# Patient Record
(demographics unavailable — no encounter records)

---

## 2024-11-11 NOTE — DISCUSSION/SUMMARY
[FreeTextEntry1] : URI and LOM - URI:  Recommend supportive care including antipyretics, fluids, OTC cough/cold medications if age-appropriate, and nasal saline/saline nebs followed by nasal suction. Return if symptoms worsen or persist. - LOM:  Complete antibiotic course. Potential side effect of antibiotics includes but not limited to diarrhea. Provide ibuprofen as needed for pain or fever. If no improvement within 48 hours return for re-evaluation.   - Return precautions reviewed. Patient to seek medical attention in ED if has decreased oral intake, decrease in wet diapers/voids, fever >100.4F, difficulty breathing, becomes lethargic, or has a change in mental status or alertness. To note if fever > 5 days must be seen immediately either in clinic or in ED. - Return/ED precautions given.  RTC routine and prn.  Caretaker expressed understanding and all questions answered.

## 2024-11-11 NOTE — HISTORY OF PRESENT ILLNESS
[de-identified] : sick [FreeTextEntry6] : 5y/o M pmhx seasonal allergies p/w cough and stuffy nose with questionable left ear pain on and off but worse overall yesterday.  No meds.  No fevers.  Tolerating PO.  Using saline nebs once a day.

## 2024-11-27 NOTE — DISCUSSION/SUMMARY
[] : The components of the vaccine(s) to be administered today are listed in the plan of care. The disease(s) for which the vaccine(s) are intended to prevent and the risks have been discussed with the caretaker.  The risks are also included in the appropriate vaccination information statements which have been provided to the patient's caregiver.  The caregiver has given consent to vaccinate. [FreeTextEntry1] : 4 year M presenting for flu shot.  Tolerated well without issue.  RTC routine and prn.  Caretaker expressed understanding and all questions answered.

## 2025-01-13 NOTE — HISTORY OF PRESENT ILLNESS
[FreeTextEntry6] : still not feeling well cough, congestion, runny nose still  now with RIGHT ear pain fever 100.9f - Ibuprofen and Acetaminophen prn  decreased appetite and is more tired  no vomiting, diarrhea, sob or difficulty breathing, joint pain or swelling, rash, urinary symptoms, headaches, LEFT ear ear pain or tugging

## 2025-01-13 NOTE — PHYSICAL EXAM
[NL] : warm, clear [Clear] : left tympanic membrane clear [Erythema] : erythema [Bulging] : bulging [Clear Rhinorrhea] : clear rhinorrhea

## 2025-01-13 NOTE — DISCUSSION/SUMMARY
[FreeTextEntry1] : URI: Recommend supportive care including antipyretics, fluids, OTC cough/cold medications if age-appropriate, and nasal saline followed by nasal suction. Alarming breathing patterns such as retractions discussed and educational video reviewed, as applicable.   RIGHT Otitis Media: Start antibiotics daily until complete. Continue supportive care.   Return precautions reviewed. Patient to be brought to the ED if has persistent decreased oral intake, decrease in wet diapers, fever >100.4F or becomes patient becomes lethargic or changed in mental status and alertness. To note if fever > 5 days must be seen immediately either in clinic or in ED.   Caretaker expressed understanding of the plan and agrees. No other concerns or questions today.

## 2025-02-06 NOTE — DISCUSSION/SUMMARY
[FreeTextEntry1] : URI: Recommend supportive care including antipyretics, fluids, OTC cough/cold medications if age-appropriate, and nasal saline followed by nasal suction. Alarming breathing patterns such as retractions discussed and educational video reviewed, as applicable.   LEFT Otitis Media: Start antibiotics daily until complete. Continue supportive care. ENT referral   Return precautions reviewed. Patient to be brought to the ED if has persistent decreased oral intake, decrease in wet diapers, fever >100.4F or becomes patient becomes lethargic or changed in mental status and alertness. To note if fever > 5 days must be seen immediately either in clinic or in ED.   Caretaker expressed understanding of the plan and agrees. No other concerns or questions today.

## 2025-02-06 NOTE — HISTORY OF PRESENT ILLNESS
[FreeTextEntry6] : last week thursday  went to  on sunday - found to have LOM, started abx amox/clav and albuterol on sunday  3x/6 mo of AOM also fever, cough, congestion, runny nose  fever downtrending, last fever this morning 100.2f - Acetaminophen and Ibuprofen prn  decreased po intake however trying to hydrate well   no vomiting, diarrhea, sob or difficulty breathing, joint pain or swelling, rash, urinary symptoms, headaches

## 2025-03-09 NOTE — ASSESSMENT
[FreeTextEntry1] : Recurrent AOM, left ear Middle ear effusion  Recommend follow up in 3 months 0 if middle ear effusion remains will offer BMT.     Total time spent on patient encounter including review of patient's medical history, physical examination, interpretation of any indicated labs / imaging and counseling, excluding time spent performing any indicated procedures: 38minutes.    Philip Gonzalez MD, MPH Director of Pediatric Otolaryngology [facility] / [facility]

## 2025-03-09 NOTE — HISTORY OF PRESENT ILLNESS
[de-identified] : Patient presents today c/o frequent ear infections. Accompanied by mother. Mother states that he had three ear infections in the last 3 months. In Nov, Dec 2024, Jan 2025. Prescribed Augmentin with no improvement. and another antibiotic with no improvement. Mother states Amoxicillin does not work for him. Recalls he had increased bad congestion in Jan 2025.

## 2025-03-09 NOTE — HISTORY OF PRESENT ILLNESS
[de-identified] : Patient presents today c/o frequent ear infections. Accompanied by mother. Mother states that he had three ear infections in the last 3 months. In Nov, Dec 2024, Jan 2025. Prescribed Augmentin with no improvement. and another antibiotic with no improvement. Mother states Amoxicillin does not work for him. Recalls he had increased bad congestion in Jan 2025.

## 2025-03-09 NOTE — ASSESSMENT
[FreeTextEntry1] : Recurrent AOM, left ear Middle ear effusion  Recommend follow up in 3 months 0 if middle ear effusion remains will offer BMT.     Total time spent on patient encounter including review of patient's medical history, physical examination, interpretation of any indicated labs / imaging and counseling, excluding time spent performing any indicated procedures: 38minutes.    Philip Gonzalez MD, MPH Director of Pediatric Otolaryngology [facility]

## 2025-06-29 NOTE — ASSESSMENT
[FreeTextEntry1] : Mouth breathing at night, concern for eustachian tube dysfunction.  Doing fine, monitor for sleep disordered breathing symptoms.  Follow up in 6 months.     Total time spent on patient encounter including review of patient's medical history, physical examination, interpretation of any indicated labs / imaging and counseling, excluding time spent performing any indicated procedures: 37 minutes.     Philip Gonzalez MD, MPH Director of Pediatric Otolaryngology [facility] / [facility]

## 2025-06-29 NOTE — HISTORY OF PRESENT ILLNESS
[FreeTextEntry1] : patient returns today for a follow up on  subsequent evaluation for frequent ear infections. mom states that patient is feeling fine but states that he went to the doctor and they said that he was snoring and that he need to come to ENT to check it out

## 2025-07-10 NOTE — DISCUSSION/SUMMARY
[FreeTextEntry1] : Strep Pharyngitis: rapid strep positive. start on antibiotics as directed. Recommend supportive care including antipyretics, fluids, OTC cough/cold medications if age-appropriate, and nasal saline followed by nasal suction. Return to clinic or ED if symptoms worsen or persist. After being on antibiotics for at least 24 hours patient less likely to spread infection   Caretaker expressed understanding of the plan and agrees. No other concerns or questions today.

## 2025-07-10 NOTE — HISTORY OF PRESENT ILLNESS
[FreeTextEntry6] : since yesterday with sore throat and fever feels likes something is stuck and trying to cough it out decreased po intake however trying to hydrate well giving Ibuprofen prn no vomiting, diarrhea, uri symptoms, sob or difficulty breathing, joint pain or swelling, rash, urinary symptoms, headaches, ear pain or tugging